# Patient Record
Sex: MALE | Race: WHITE | NOT HISPANIC OR LATINO | Employment: UNEMPLOYED | URBAN - METROPOLITAN AREA
[De-identification: names, ages, dates, MRNs, and addresses within clinical notes are randomized per-mention and may not be internally consistent; named-entity substitution may affect disease eponyms.]

---

## 2023-12-23 ENCOUNTER — HOSPITAL ENCOUNTER (EMERGENCY)
Facility: HOSPITAL | Age: 28
Discharge: HOME | End: 2023-12-24
Attending: EMERGENCY MEDICINE

## 2023-12-23 VITALS — TEMPERATURE: 99 F

## 2023-12-23 DIAGNOSIS — T40.711A OVERDOSE OF MARIJUANA, ACCIDENTAL OR UNINTENTIONAL, INITIAL ENCOUNTER: Primary | ICD-10-CM

## 2023-12-23 DIAGNOSIS — R41.82 ALTERED MENTAL STATUS, UNSPECIFIED ALTERED MENTAL STATUS TYPE: ICD-10-CM

## 2023-12-23 DIAGNOSIS — R45.1 AGITATION: ICD-10-CM

## 2023-12-23 PROCEDURE — 96372 THER/PROPH/DIAG INJ SC/IM: CPT | Performed by: EMERGENCY MEDICINE

## 2023-12-23 PROCEDURE — 63600175 PHARM REV CODE 636 W HCPCS: Performed by: EMERGENCY MEDICINE

## 2023-12-23 PROCEDURE — 99282 EMERGENCY DEPT VISIT SF MDM: CPT

## 2023-12-23 RX ORDER — LORAZEPAM 2 MG/ML
2 INJECTION INTRAMUSCULAR
Status: COMPLETED | OUTPATIENT
Start: 2023-12-23 | End: 2023-12-23

## 2023-12-23 RX ORDER — DIPHENHYDRAMINE HYDROCHLORIDE 50 MG/ML
50 INJECTION, SOLUTION INTRAMUSCULAR; INTRAVENOUS
Status: COMPLETED | OUTPATIENT
Start: 2023-12-23 | End: 2023-12-23

## 2023-12-23 RX ORDER — HALOPERIDOL 5 MG/ML
5 INJECTION INTRAMUSCULAR
Status: COMPLETED | OUTPATIENT
Start: 2023-12-23 | End: 2023-12-23

## 2023-12-23 RX ADMIN — LORAZEPAM 2 MG: 2 INJECTION INTRAMUSCULAR; INTRAVENOUS at 09:12

## 2023-12-23 RX ADMIN — DIPHENHYDRAMINE HYDROCHLORIDE 50 MG: 50 INJECTION, SOLUTION INTRAMUSCULAR; INTRAVENOUS at 09:12

## 2023-12-23 RX ADMIN — HALOPERIDOL LACTATE 5 MG: 5 INJECTION, SOLUTION INTRAMUSCULAR at 09:12

## 2023-12-24 NOTE — ED PROVIDER NOTES
"Encounter Date: 12/23/2023       History     Chief Complaint   Patient presents with    Ingestion     Pt arrived via ems, pt chief complaint is Ingestion. Pt was given a THC gummy and is very manic in triage.      29 yo male with bipolar disorder presents via [facility] from mother's home with agitation.  Patient lives at a transitional home for adults with psychiatric disorders ([facility] in [place], [place]).  He left his group home on 12/19/23 spend time with his father in [place].  He took the train down to [place] 12/21-12/22, arriving yesterday.  Mother states that father said that patient slept well on the train.  However, last night, patient did not sleep.  Mother tried giving patient a half of a THC gummy to calm him down last night, but it did not work.  Patient did not take any of his usual medications today.  Tonight, mother gave him a whole THC gummy (25mg Delta9 THC) to try to help him relax; however, the gummy caused patient to become agitated and manic.  Patient is saying,  "What did you do to me?"  He knows he is in a hospital and knows his mother.  However, he is mistaking the security guards for his father.      Current meds:  Divalproex ER 1000mg PO BID  Propranolol 10mg PO BID  Zinc BID  Vitamin C qAM  Oxcarbazepine 1200mg PO qHS  Quetiapine 450mg PO qHS  Melatonin 5mg PO qHS  Temazepam 30mg PO qHS      Review of patient's allergies indicates:  No Known Allergies  No past medical history on file.  No past surgical history on file.  No family history on file.  Social History     Tobacco Use    Smoking status: Unknown   Substance Use Topics    Alcohol use: Never    Drug use: Never     Review of Systems   Unable to perform ROS: Psychiatric disorder   Musculoskeletal:  Negative for gait problem.   Psychiatric/Behavioral:  Positive for agitation, confusion and sleep disturbance.        Physical Exam     Initial Vitals [12/23/23 2138]   BP Pulse Resp Temp SpO2   -- -- -- 98.5 °F (36.9 °C) " --      MAP       --         Physical Exam    Nursing note and vitals reviewed.  Constitutional: He appears well-developed and well-nourished. He is not diaphoretic. He appears distressed.   Awake, alert, anxious, staring straight ahead, pressured and loud speech   HENT:   Head: Normocephalic and atraumatic.   Eyes: Conjunctivae and EOM are normal. Pupils are equal, round, and reactive to light.   Neck: Neck supple.   Normal range of motion.  Cardiovascular:  Regular rhythm and intact distal pulses.           Tachycardic, regular.   Pulmonary/Chest: Breath sounds normal. No respiratory distress. He has no wheezes. He has no rhonchi. He has no rales.   Abdominal: Abdomen is soft. There is no abdominal tenderness.   Musculoskeletal:         General: No tenderness or edema. Normal range of motion.      Cervical back: Normal range of motion and neck supple.     Neurological: He is alert.   Moving all extremities   Skin: Skin is warm and dry.   Psychiatric:   Agitated, speaking loudly, pressured         ED Course   Procedures  Labs Reviewed - No data to display         Imaging Results    None          Medications   LORazepam injection 2 mg (2 mg Intramuscular Given 12/23/23 2139)   diphenhydrAMINE injection 50 mg (50 mg Intramuscular Given 12/23/23 2157)   haloperidol lactate injection 5 mg (5 mg Intramuscular Given 12/23/23 2157)     Medical Decision Making  29 yo male with agitation.  History of bipolar, did not sleep well last night, given THC gummy by family member.    Ddx includes toxidrome, psychiatric disorder, danger to self/others, other.    Patient received IM ativan 2mg followed by IM haldol 5mg and IM benadryl 50mg.    Patient gradually became calmer and more appropriate in the ER.  He stated he felt ready to go home and go to sleep, and parents felt comfortable taking him home.  Patient did not want to take all his regular meds tonight, but he agreed to take Temazepam and Quetiapine, which should assist with  sleep.  I have advised him to resume regular pills tomorrow.      Amount and/or Complexity of Data Reviewed  Labs: ordered.    Risk  Prescription drug management.                                      Clinical Impression:  Final diagnoses:  [T40.801A] Overdose of marijuana, accidental or unintentional, initial encounter (Primary)  [R45.1] Agitation  [R41.82] Altered mental status, unspecified altered mental status type          ED Disposition Condition    Discharge Stable          ED Prescriptions    None       Follow-up Information    None          Miroslava Salvador MD  12/24/23 8229

## 2023-12-24 NOTE — ED NOTES
Couldn't take vitals on patient due to his agitated behavior, parents at bedside. Dr Colorado Made aware

## 2023-12-24 NOTE — ED TRIAGE NOTES
"Pt BIB EMS, Report received from LULA Tuttle  Pt has a history of bipolar disorder, autistic, lives in an adult home. Pt took THC gummy to calm down and he started having the maniac episode    Pt is very agitated, yelling, states " please help, I need psychiatric medicine , what did I do . I can't breathe". Pt got 2 mg of Ativan at 21:39 in ED  "

## 2023-12-24 NOTE — ED NOTES
Dr Salvador states its fine to discharge patients without vitals as it might agitate/aggravate him.  , patient is calm at this time, safe to get discharged, no acute distress noted , parents at bedside

## 2023-12-24 NOTE — ED NOTES
Dr Salvador states that the main goal is to pu patient into sleep, don't need any labs at this time